# Patient Record
(demographics unavailable — no encounter records)

---

## 2025-03-19 NOTE — ASSESSMENT
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  #CKD Stage3 Pauci-immune ANCA vasculitis - s/p Rituxan 1g 3/8 and 3/29; s/p pulse steroids in hospital in March 2018;  off steroids since 10/9/18;  s/p 2 doses of maintenance Rituxan in 10/2018; s/p Rituxan maintenance- May 2019, last Rituxan 9/24/19 - SCr has improved since admission (was 2.7-2.8) - proteinuria had improved as well; ?hematuria 3 RBC which needed to be followed -ESR and CRP were stable; last cr stable  Will repeat renal panel, Urine studies, ESR/CRP    #HTN, with component of white-coat HTN -BP improved on recheck  - continue amlodipine 5.0 mg PO daily - cont salt restriction - monitor BP at home, and call if elevated  # h/o hyponatremia - serum Na has been stable  #anemia-resolved; Will check cbc and iron studies today  #refusing covid19 vaccine  #advised to see her PCP. advised to see rheumatology advised follow up for mammograms, pap smear, colonoscopy

## 2025-03-19 NOTE — PHYSICAL EXAM
[General Appearance - Alert] : alert [General Appearance - In No Acute Distress] : in no acute distress [General Appearance - Well Nourished] : well nourished [General Appearance - Well Developed] : well developed [Sclera] : the sclera and conjunctiva were normal [Outer Ear] : the ears and nose were normal in appearance [Neck Appearance] : the appearance of the neck was normal [] : no respiratory distress [Respiration, Rhythm And Depth] : normal respiratory rhythm and effort [Exaggerated Use Of Accessory Muscles For Inspiration] : no accessory muscle use [Auscultation Breath Sounds / Voice Sounds] : lungs were clear to auscultation bilaterally [Apical Impulse] : the apical impulse was normal [Heart Sounds] : normal S1 and S2 [Heart Sounds Pericardial Friction Rub] : no pericardial rub [Edema] : there was no peripheral edema [Bowel Sounds] : normal bowel sounds [Abdomen Soft] : soft [Abdomen Tenderness] : non-tender [No CVA Tenderness] : no ~M costovertebral angle tenderness [Abnormal Walk] : normal gait [Skin Color & Pigmentation] : normal skin color and pigmentation [No Focal Deficits] : no focal deficits [Oriented To Time, Place, And Person] : oriented to person, place, and time [Impaired Insight] : insight and judgment were intact [Affect] : the affect was normal

## 2025-03-19 NOTE — HISTORY OF PRESENT ILLNESS
[FreeTextEntry1] : Here for follow up Has not seen any doctors since last visit since she last saw me Didn't see Rheumatology or PCP either as suggested Stated she did a mammogram last summer and pap smear as well  Stated she feels overall okay, no joint pains, rashes Denies foamy urine or bubbly urine, or gross hematuria, denies burning or pain with urination Denies leg swelling Denies sob, cp No recent viral illness Other ROS neg  Checks BP at home:<120s/60s Takes amlodipine 5mg daily